# Patient Record
Sex: FEMALE | Race: WHITE | NOT HISPANIC OR LATINO | Employment: STUDENT | URBAN - METROPOLITAN AREA
[De-identification: names, ages, dates, MRNs, and addresses within clinical notes are randomized per-mention and may not be internally consistent; named-entity substitution may affect disease eponyms.]

---
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
## 2025-01-30 ENCOUNTER — OFFICE VISIT (OUTPATIENT)
Dept: URGENT CARE | Facility: CLINIC | Age: 20
End: 2025-01-30
Payer: COMMERCIAL

## 2025-01-30 VITALS
OXYGEN SATURATION: 95 % | SYSTOLIC BLOOD PRESSURE: 118 MMHG | RESPIRATION RATE: 18 BRPM | TEMPERATURE: 98 F | BODY MASS INDEX: 25.22 KG/M2 | WEIGHT: 151.38 LBS | DIASTOLIC BLOOD PRESSURE: 72 MMHG | HEIGHT: 65 IN | HEART RATE: 62 BPM

## 2025-01-30 DIAGNOSIS — H10.9 CONJUNCTIVITIS, UNSPECIFIED CONJUNCTIVITIS TYPE, UNSPECIFIED LATERALITY: Primary | ICD-10-CM

## 2025-01-30 PROCEDURE — 99213 OFFICE O/P EST LOW 20 MIN: CPT | Mod: S$GLB,,, | Performed by: FAMILY MEDICINE

## 2025-01-30 RX ORDER — MOXIFLOXACIN 5 MG/ML
1 SOLUTION/ DROPS OPHTHALMIC 3 TIMES DAILY
Qty: 3 ML | Refills: 0 | Status: SHIPPED | OUTPATIENT
Start: 2025-01-30 | End: 2025-02-06

## 2025-01-30 NOTE — PATIENT INSTRUCTIONS
Thank you for allowing our team to take care of you today.  The diagnosis today is conjunctivitis.  This is contagious so be careful around those around you.   Gently wash around the eyelids. Wash hands if you touch the eyes.  Adding Vigamox eye drops one drop each eye three times a day for seven days.  If any new symptoms or worsening symptoms, get an immediate medical provider/ER evaluation.  Followup here as needed.  Attached is a handout with general information on this diagnosis.   
Detail Level: Simple
Instructions: This plan will send the code FBSE to the PM system.  DO NOT or CHANGE the price.
Price (Do Not Change): 0.00
Body Of Note (Please Add Your Own Text Here): recommend skin cancer screening q 6 months

## 2025-01-30 NOTE — PROGRESS NOTES
"Subjective:      Patient ID: Bertha Escobar is a 19 y.o. female.    Vitals:  height is 5' 5" (1.651 m) and weight is 68.6 kg (151 lb 5.5 oz). Her tympanic temperature is 98.3 °F (36.8 °C). Her blood pressure is 118/72 and her pulse is 62. Her respiration is 18 and oxygen saturation is 95%.     Chief Complaint: Conjunctivitis    20 yo female patient presents with right red eye starting yesterday. Had some swelling and discharge in the beginning.  Patient stated eye keeps draining. Vision has been fine. Doesn't wear contacts or glasses. Other eye starting. No other associating symptoms. No known exposures. Symptoms started 1 day ago.    Conjunctivitis  This is a new problem. The current episode started yesterday. The problem occurs constantly. The problem has been gradually worsening. Pertinent negatives include no abdominal pain, anorexia, arthralgias, change in bowel habit, chest pain, chills, congestion, coughing, diaphoresis, fatigue, fever, headaches, joint swelling, myalgias, nausea, neck pain, numbness, rash, sore throat, swollen glands, urinary symptoms, vertigo, visual change, vomiting or weakness. Nothing aggravates the symptoms. She has tried nothing for the symptoms. The treatment provided mild relief.       Constitution: Negative for chills, sweating, fatigue and fever.   HENT: Negative.  Negative for congestion and sore throat.    Neck: Negative for neck pain.   Cardiovascular:  Negative for chest pain.   Eyes:  Positive for eye discharge, eye itching and eye redness. Negative for eye trauma, foreign body in eye, eye pain, photophobia and vision loss.   Respiratory: Negative.  Negative for cough.    Gastrointestinal:  Negative for abdominal pain, nausea and vomiting.   Genitourinary: Negative.    Musculoskeletal:  Negative for joint pain, joint swelling and muscle ache.   Skin:  Negative for rash.   Neurological:  Negative for history of vertigo, headaches and numbness.      Objective:     Physical Exam "   Constitutional: She is oriented to person, place, and time. No distress.   HENT:   Head: Normocephalic and atraumatic.   Ears:   Right Ear: Tympanic membrane, external ear and ear canal normal.   Left Ear: Tympanic membrane, external ear and ear canal normal.   Nose: Nose normal. No sinus tenderness. No epistaxis. Right sinus exhibits no maxillary sinus tenderness. Left sinus exhibits no maxillary sinus tenderness.   Mouth/Throat: Mucous membranes are moist. No oropharyngeal exudate or posterior oropharyngeal erythema. Oropharynx is clear.   Eyes: Lids are normal. Pupils are equal, round, and reactive to light. Right eye exhibits discharge (mild). Right eye exhibits no hordeolum. No foreign body present in the right eye. No foreign body present in the left eye. Right conjunctiva is injected. Right conjunctiva has no hemorrhage. Left conjunctiva is not injected. Left conjunctiva has no hemorrhage. Right eye exhibits no nystagmus. Left eye exhibits no nystagmus. Extraocular movement intact vision grossly intact      Comments: Left eyelid with mild crusting.    Neck: Neck supple.   Cardiovascular: Normal rate, regular rhythm, normal heart sounds and normal pulses.   Pulmonary/Chest: Effort normal and breath sounds normal.   Abdominal: Normal appearance.   Musculoskeletal: Normal range of motion.         General: Normal range of motion.   Neurological: no focal deficit. She is alert and oriented to person, place, and time.   Skin: Skin is warm and no rash.         Comments: Normal turgor   Psychiatric: Her behavior is normal. Mood, judgment and thought content normal.   Nursing note and vitals reviewed.      Assessment:     1. Conjunctivitis, unspecified conjunctivitis type, unspecified laterality        Plan:       Conjunctivitis, unspecified conjunctivitis type, unspecified laterality    Other orders  -     moxifloxacin (VIGAMOX) 0.5 % ophthalmic solution; Place 1 drop into both eyes 3 (three) times daily. for 7  days  Dispense: 3 mL; Refill: 0      Review of patient's allergies indicates:  No Known Allergies    SUMMARY: See hpi. Adding above. Contact precautions. Clean gently prior. Handout on conjunctivitis given as well. See below. May need eye doc/medical provider evaluation if continues or worsening symptoms.     Patient Instructions   Thank you for allowing our team to take care of you today.  The diagnosis today is conjunctivitis.  This is contagious so be careful around those around you.   Gently wash around the eyelids. Wash hands if you touch the eyes.  Adding Vigamox eye drops one drop each eye three times a day for seven days.  If any new symptoms or worsening symptoms, get an immediate medical provider/ER evaluation.  Followup here as needed.  Attached is a handout with general information on this diagnosis.

## 2025-01-30 NOTE — LETTER
January 30, 2025    Bertha Escobar  [address] [place] [address]             [facility]  [address] [place] [address] [place] [address]  Phone: [phone]  Fax: [phone]   January 30, 2025     Patient: Bertha Escobar   YOB: 2005   Date of Visit: 1/30/2025       To Whom it May Concern:    Bertha Escobar was seen in my clinic on 1/30/2025. She may return to school on 02/03/2025 .    Please excuse her from any classes or work missed.    If you have any questions or concerns, please don't hesitate to call.    Sincerely,         Angelica Montilla MD

## 2025-03-27 RX ORDER — NORETHINDRONE ACETATE AND ETHINYL ESTRADIOL .02; 1 MG/1; MG/1
1 TABLET ORAL
Qty: 84 TABLET | Refills: 0 | Status: SHIPPED | OUTPATIENT
Start: 2025-03-27

## 2025-03-28 NOTE — TELEPHONE ENCOUNTER
Refill Decision Note   Bertha Escobar  is requesting a refill authorization.  Brief Assessment and Rationale for Refill:  Approve     Medication Therapy Plan:       Medication Reconciliation Completed: No   Comments:     No Care Gaps recommended.     Note composed:11:02 PM 03/27/2025